# Patient Record
Sex: MALE | Race: WHITE | Employment: FULL TIME | URBAN - METROPOLITAN AREA
[De-identification: names, ages, dates, MRNs, and addresses within clinical notes are randomized per-mention and may not be internally consistent; named-entity substitution may affect disease eponyms.]

---

## 2020-11-05 ENCOUNTER — HOSPITAL ENCOUNTER (EMERGENCY)
Age: 25
Discharge: HOME | End: 2020-11-05
Attending: EMERGENCY MEDICINE | Admitting: EMERGENCY MEDICINE
Payer: COMMERCIAL

## 2020-11-05 ENCOUNTER — APPOINTMENT (OUTPATIENT)
Dept: ENDOSCOPY | Age: 25
End: 2020-11-05
Attending: INTERNAL MEDICINE
Payer: COMMERCIAL

## 2020-11-05 ENCOUNTER — ANESTHESIA EVENT (OUTPATIENT)
Dept: ENDOSCOPY | Age: 25
End: 2020-11-05
Payer: COMMERCIAL

## 2020-11-05 ENCOUNTER — ANESTHESIA (OUTPATIENT)
Dept: ENDOSCOPY | Age: 25
End: 2020-11-05
Payer: COMMERCIAL

## 2020-11-05 ENCOUNTER — APPOINTMENT (OUTPATIENT)
Dept: CT IMAGING | Age: 25
End: 2020-11-05
Attending: EMERGENCY MEDICINE
Payer: COMMERCIAL

## 2020-11-05 VITALS
HEIGHT: 72 IN | TEMPERATURE: 98.1 F | DIASTOLIC BLOOD PRESSURE: 84 MMHG | OXYGEN SATURATION: 99 % | BODY MASS INDEX: 31.15 KG/M2 | SYSTOLIC BLOOD PRESSURE: 125 MMHG | HEART RATE: 65 BPM | RESPIRATION RATE: 18 BRPM | WEIGHT: 230 LBS

## 2020-11-05 DIAGNOSIS — T18.108A IMPACTED FOREIGN BODY IN ESOPHAGUS, INITIAL ENCOUNTER: Primary | ICD-10-CM

## 2020-11-05 LAB
ANION GAP SERPL CALC-SCNC: 6 MMOL/L (ref 5–15)
BASOPHILS # BLD: 0.1 K/UL (ref 0–0.1)
BASOPHILS NFR BLD: 2 % (ref 0–1)
BUN SERPL-MCNC: 13 MG/DL (ref 6–20)
BUN/CREAT SERPL: 10 (ref 12–20)
CA-I BLD-MCNC: 9.2 MG/DL (ref 8.5–10.1)
CHLORIDE SERPL-SCNC: 104 MMOL/L (ref 97–108)
CO2 SERPL-SCNC: 32 MMOL/L (ref 21–32)
CREAT SERPL-MCNC: 1.26 MG/DL (ref 0.7–1.3)
DIFFERENTIAL METHOD BLD: ABNORMAL
EOSINOPHIL # BLD: 0.3 K/UL (ref 0–0.4)
EOSINOPHIL NFR BLD: 4 % (ref 0–7)
ERYTHROCYTE [DISTWIDTH] IN BLOOD BY AUTOMATED COUNT: 12.2 % (ref 11.5–14.5)
GLUCOSE SERPL-MCNC: 85 MG/DL (ref 65–100)
HCT VFR BLD AUTO: 43 % (ref 36.6–50.3)
HGB BLD-MCNC: 15.1 G/DL (ref 12.1–17)
IMM GRANULOCYTES # BLD AUTO: 0 K/UL (ref 0–0.04)
IMM GRANULOCYTES NFR BLD AUTO: 0 % (ref 0–0.5)
LYMPHOCYTES # BLD: 2.3 K/UL (ref 0.8–3.5)
LYMPHOCYTES NFR BLD: 36 % (ref 12–49)
MCH RBC QN AUTO: 30.1 PG (ref 26–34)
MCHC RBC AUTO-ENTMCNC: 35.1 G/DL (ref 30–36.5)
MCV RBC AUTO: 85.8 FL (ref 80–99)
MONOCYTES # BLD: 0.5 K/UL (ref 0–1)
MONOCYTES NFR BLD: 8 % (ref 5–13)
NEUTS SEG # BLD: 3.3 K/UL (ref 1.8–8)
NEUTS SEG NFR BLD: 50 % (ref 32–75)
PLATELET # BLD AUTO: 250 K/UL (ref 150–400)
PMV BLD AUTO: 8.8 FL (ref 8.9–12.9)
POTASSIUM SERPL-SCNC: 3.9 MMOL/L (ref 3.5–5.1)
RBC # BLD AUTO: 5.01 M/UL (ref 4.1–5.7)
SODIUM SERPL-SCNC: 142 MMOL/L (ref 136–145)
WBC # BLD AUTO: 6.4 K/UL (ref 4.1–11.1)

## 2020-11-05 PROCEDURE — 85025 COMPLETE CBC W/AUTO DIFF WBC: CPT

## 2020-11-05 PROCEDURE — 76060000031 HC ANESTHESIA FIRST 0.5 HR: Performed by: INTERNAL MEDICINE

## 2020-11-05 PROCEDURE — 74011250636 HC RX REV CODE- 250/636: Performed by: EMERGENCY MEDICINE

## 2020-11-05 PROCEDURE — 76040000019: Performed by: INTERNAL MEDICINE

## 2020-11-05 PROCEDURE — 74011250636 HC RX REV CODE- 250/636: Performed by: ANESTHESIOLOGY

## 2020-11-05 PROCEDURE — 71260 CT THORAX DX C+: CPT

## 2020-11-05 PROCEDURE — 99283 EMERGENCY DEPT VISIT LOW MDM: CPT

## 2020-11-05 PROCEDURE — 74011000636 HC RX REV CODE- 636: Performed by: EMERGENCY MEDICINE

## 2020-11-05 PROCEDURE — 36415 COLL VENOUS BLD VENIPUNCTURE: CPT

## 2020-11-05 PROCEDURE — 80048 BASIC METABOLIC PNL TOTAL CA: CPT

## 2020-11-05 PROCEDURE — 70491 CT SOFT TISSUE NECK W/DYE: CPT

## 2020-11-05 RX ORDER — PROPOFOL 10 MG/ML
INJECTION, EMULSION INTRAVENOUS
Status: COMPLETED
Start: 2020-11-05 | End: 2020-11-05

## 2020-11-05 RX ORDER — PROPOFOL 10 MG/ML
INJECTION, EMULSION INTRAVENOUS AS NEEDED
Status: DISCONTINUED | OUTPATIENT
Start: 2020-11-05 | End: 2020-11-05 | Stop reason: HOSPADM

## 2020-11-05 RX ORDER — PROPOFOL 10 MG/ML
INJECTION, EMULSION INTRAVENOUS
Status: DISCONTINUED
Start: 2020-11-05 | End: 2020-11-05 | Stop reason: WASHOUT

## 2020-11-05 RX ORDER — SUCCINYLCHOLINE CHLORIDE 20 MG/ML INJECTION SOLUTION
SOLUTION
Status: DISPENSED
Start: 2020-11-05 | End: 2020-11-06

## 2020-11-05 RX ADMIN — IOPAMIDOL 100 ML: 755 INJECTION, SOLUTION INTRAVENOUS at 19:13

## 2020-11-05 RX ADMIN — PROPOFOL 100 MG: 10 INJECTION, EMULSION INTRAVENOUS at 21:01

## 2020-11-05 RX ADMIN — SODIUM CHLORIDE 1000 ML: 9 INJECTION, SOLUTION INTRAVENOUS at 19:14

## 2020-11-06 NOTE — ANESTHESIA POSTPROCEDURE EVALUATION
Procedure(s):  ENDOSCOPIC FOREIGN BODY REMOVAL. total IV anesthesia    Anesthesia Post Evaluation      Multimodal analgesia: multimodal analgesia not used between 6 hours prior to anesthesia start to PACU discharge  Patient location during evaluation: bedside (Endoscopy suite)  Patient participation: complete - patient cannot participate  Level of consciousness: awake  Pain score: 0  Pain management: adequate  Airway patency: patent  Anesthetic complications: no  Cardiovascular status: acceptable  Respiratory status: acceptable and nasal cannula  Hydration status: acceptable  Comments: This patient remained on the stretcher. The patient was handed off to the endoscopy nursing team.  All questions regarding pre-, intra-, and postoperative care were answered.   Post anesthesia nausea and vomiting:  none      INITIAL Post-op Vital signs:   Vitals Value Taken Time   /62 11/5/2020  9:05 PM   Temp     Pulse 77 11/5/2020  9:05 PM   Resp 18 11/5/2020  9:05 PM   SpO2 100 % 11/5/2020  9:05 PM Simple: Patient demonstrates quick and easy understanding

## 2020-11-06 NOTE — PROGRESS NOTES
Pt status post egd. Vital signs stable. transported via stretcher back down to the ER with dad by his side. Taken back to chair 3 in front of triage room. Report given to [facility].

## 2020-11-06 NOTE — ED PROVIDER NOTES
EMERGENCY DEPARTMENT HISTORY AND PHYSICAL EXAM      Date: 11/5/2020  Patient Name: Jyotsna Persaud    History of Presenting Illness     Chief Complaint   Patient presents with    Foreign Body Swallowed       History Provided By: Patient    HPI: Jyotsna Persaud, 26 y.o. male presents to the ED with cc of   Chief Complaint   Patient presents with    Foreign Body Swallowed     Patient was having his dinner when piece of pork got stuck in his throat complaining of pain and unable to swallow, also nausea and vomiting unable to swallow solids or liquids    There are no other complaints, changes, or physical findings at this time. PCP: No primary care provider on file. No current facility-administered medications on file prior to encounter. No current outpatient medications on file prior to encounter. Past History     Past Medical History:  History reviewed. No pertinent past medical history. Past Surgical History:  History reviewed. No pertinent surgical history. Family History:  History reviewed. No pertinent family history. Social History:  Social History     Tobacco Use    Smoking status: Never Smoker    Smokeless tobacco: Never Used   Substance Use Topics    Alcohol use: Not on file    Drug use: Not on file       Allergies: Allergies   Allergen Reactions    Cefzil [Cefprozil] Unknown (comments)    Fish Containing Products Unknown (comments)    Seafood Unknown (comments)         Review of Systems   Review of Systems   Constitutional: Negative. HENT: Positive for trouble swallowing. Negative for congestion, nosebleeds, sinus pressure and sinus pain. Eyes: Negative. Negative for photophobia. Respiratory: Negative for apnea, cough, choking, chest tightness, shortness of breath, wheezing and stridor. Cardiovascular: Positive for chest pain. Gastrointestinal: Positive for abdominal pain and vomiting. Genitourinary: Negative. Musculoskeletal: Negative.   Negative for back pain and gait problem. Skin: Negative. Allergic/Immunologic: Negative. Neurological: Negative. Negative for weakness, light-headedness and numbness. Hematological: Negative. Psychiatric/Behavioral: Negative. Physical Exam   Physical Exam  Vitals signs and nursing note reviewed. Constitutional:       General: He is in acute distress. Appearance: Normal appearance. He is normal weight. HENT:      Head: Normocephalic and atraumatic. Nose: No congestion or rhinorrhea. Eyes:      Extraocular Movements: Extraocular movements intact. Pupils: Pupils are equal, round, and reactive to light. Neck:      Musculoskeletal: Normal range of motion and neck supple. Cardiovascular:      Rate and Rhythm: Normal rate and regular rhythm. Pulmonary:      Effort: Pulmonary effort is normal.      Breath sounds: Normal breath sounds. Abdominal:      General: Abdomen is flat. Bowel sounds are normal. There is no distension. Tenderness: There is no abdominal tenderness. There is no guarding. Musculoskeletal: Normal range of motion. Skin:     General: Skin is warm and dry. Neurological:      General: No focal deficit present. Mental Status: He is alert and oriented to person, place, and time. Psychiatric:         Mood and Affect: Mood normal.         Diagnostic Study Results     Labs -     Recent Results (from the past 12 hour(s))   CBC WITH AUTOMATED DIFF    Collection Time: 11/05/20  7:00 PM   Result Value Ref Range    WBC 6.4 4.1 - 11.1 K/uL    RBC 5.01 4.10 - 5.70 M/uL    HGB 15.1 12.1 - 17.0 g/dL    HCT 43.0 36.6 - 50.3 %    MCV 85.8 80.0 - 99.0 FL    MCH 30.1 26.0 - 34.0 PG    MCHC 35.1 30.0 - 36.5 g/dL    RDW 12.2 11.5 - 14.5 %    PLATELET 893 670 - 092 K/uL    MPV 8.8 (L) 8.9 - 12.9 FL    NEUTROPHILS 50 32 - 75 %    LYMPHOCYTES 36 12 - 49 %    MONOCYTES 8 5 - 13 %    EOSINOPHILS 4 0 - 7 %    BASOPHILS 2 (H) 0 - 1 %    IMMATURE GRANULOCYTES 0 0.0 - 0.5 %    ABS. NEUTROPHILS 3.3 1.8 - 8.0 K/UL    ABS. LYMPHOCYTES 2.3 0.8 - 3.5 K/UL    ABS. MONOCYTES 0.5 0.0 - 1.0 K/UL    ABS. EOSINOPHILS 0.3 0.0 - 0.4 K/UL    ABS. BASOPHILS 0.1 0.0 - 0.1 K/UL    ABS. IMM. GRANS. 0.0 0.00 - 0.04 K/UL    DF AUTOMATED     METABOLIC PANEL, BASIC    Collection Time: 11/05/20  7:00 PM   Result Value Ref Range    Sodium 142 136 - 145 mmol/L    Potassium 3.9 3.5 - 5.1 mmol/L    Chloride 104 97 - 108 mmol/L    CO2 32 21 - 32 mmol/L    Anion gap 6 5 - 15 mmol/L    Glucose 85 65 - 100 mg/dL    BUN 13 6 - 20 mg/dL    Creatinine 1.26 0.70 - 1.30 mg/dL    BUN/Creatinine ratio 10 (L) 12 - 20      GFR est AA >60 >60 ml/min/1.73m2    GFR est non-AA >60 >60 ml/min/1.73m2    Calcium 9.2 8.5 - 10.1 mg/dL       Labs reviewed by me    Radiologic Studies -   CT CHEST W CONT   Final Result   IMPRESSION: Soft tissue content distal esophagus, this extends over 4 cm length   with distention of the esophagus to about 2.5 cm. Correlate ingestive material.   Fluid and air distends proximal esophagus. No pneumomediastinum. No   pneumothorax. No pleural or pericardial effusion. CT NECK SOFT TISSUE W CONT   Final Result   IMPRESSION: Fluid and air filled distended upper esophagus. See CT chest   findings        CT Results  (Last 48 hours)               11/05/20 1912  CT CHEST W CONT Final result    Impression:  IMPRESSION: Soft tissue content distal esophagus, this extends over 4 cm length   with distention of the esophagus to about 2.5 cm. Correlate ingestive material.   Fluid and air distends proximal esophagus. No pneumomediastinum. No   pneumothorax. No pleural or pericardial effusion. Narrative:  CT chest with IV contrast       Axial images are reviewed along with reformatted sagittal/coronal images. 100 mL   Isovue 370 administered.     Dose reduction: All CT scans at this facility are performed using dose reduction   optimization techniques as appropriate to a performed exam including the   following- automated exposure control, adjustments of mA and/or Kv according to patient   size, or use of iterative reconstructive technique. The lungs are clear. No pneumothorax. No pneumomediastinum. No pleural effusion. Enhanced images demonstrate normal contrast opacification of the thoracic aorta   and great vessels traversing the superior mediastinum. There is normal contrast   opacification central pulmonary vessels. The esophagus is distended with fluid   and debris. Within the distal esophagus there appears to be soft tissue content   this extends over an approximate 4 cm length with width of about 2.5 cm. Imaged content upper abdomen otherwise unremarkable. 11/05/20 1912  CT NECK SOFT TISSUE W CONT Final result    Impression:  IMPRESSION: Fluid and air filled distended upper esophagus. See CT chest   findings       Narrative:  CT neck with IV contrast       CT chest reported separately. Axial images are reviewed along with reformatted sagittal/coronal images. 100 mL   Isovue 370 administered. Dose reduction: All CT scans at this facility are performed using dose reduction   optimization techniques as appropriate to a performed exam including the   following-   automated exposure control, adjustments of mA and/or Kv according to patient   size, or use of iterative reconstructive technique. Symmetric normal appearance to nasopharyngeal tissues. No foreign body evident   through pharynx. Intrathoracic esophagus is air and debris filled distended. See   CT chest findings. Contrast opacification for great vessels traversing the superior mediastinum and   neck. Patent venous drainage through the neck. Normal appearance for the thyroid   gland. No lymphadenopathy. No subcutaneous/extraluminal air. Proximal trachea   patent. Imaged upper lungs are clear.                CXR Results  (Last 48 hours)    None            Medical Decision Making     I am the first provider for this patient. I reviewed the vital signs, available nursing notes, past medical history, past surgical history, family history and social history. RADIOLOGY report and LABS reviewed by me    Vital Signs-Reviewed the patient's vital signs. Patient Vitals for the past 12 hrs:   Temp Pulse Resp BP SpO2   11/05/20 1843 98.1 °F (36.7 °C) 69 18 133/88 98 %       EKG interpretation: (Preliminary)      Records Reviewed: Nurse's note. Provider Notes (Medical Decision Making):    Patient presents with DIFF DX :         ED Course:   Initial assessment performed. The patients presenting problems have been discussed, and they are in agreement with the care plan formulated and outlined with them. I have encouraged them to ask questions as they arise throughout their visit. Consultations: -  Gastroenterology:  Dr. Lulu Banuelos . We have asked for emergent assistance with regard to this patient. We have discussed the patients HPI, ROS, PE and results this far. They will come and evaluate the patient for their gastrointestinal needs and recommend further treatment with possible admission. Dr. Sharon Dubois will do upper endoscopy to retrieve the foreign body [address] [facility], [place]      Disposition:  GI Lab   Diagnostic tests were reviewed and questions answered. Diagnosis, care plan and treatment options were discussed. The patient understand instructions and will follow up as directed. Condition stable    Admitting Provider:  No admitting provider for patient encounter. Dr. Shireen Lockwood Provider:  No ref. provider found       DISCHARGE PLAN:  1. There are no discharge medications for this patient. 2.   Follow-up Information    None       3. Return to ED if worse     Diagnosis     Clinical Impression:     ICD-10-CM ICD-9-CM    1.  Impacted foreign body in esophagus, initial encounter  T18.108A 935.1      E915     2 vomiting    Attestations:    Raman Prater MD    Please note that this dictation was completed with [employer], the computer voice recognition software. Quite often unanticipated grammatical, syntax, homophones, and other interpretive errors are inadvertently transcribed by the computer software. Please disregard these errors. Please excuse any errors that have escaped final proofreading. Thank you.

## 2020-11-06 NOTE — ROUTINE PROCESS
TRANSFER - OUT REPORT:    Verbal report given to Luis (name) on [facility]  being transferred to [facility](unit) for routine progression of care       Report consisted of patients Situation, Background, Assessment and   Recommendations(SBAR). Information from the following report(s) SBAR, ED Summary and MAR was reviewed with the receiving nurse. Lines:   Peripheral IV 11/05/20 Left Antecubital (Active)        Opportunity for questions and clarification was provided.       Patient transported with:   Registered Nurse

## 2020-11-06 NOTE — ANESTHESIA PREPROCEDURE EVALUATION
Relevant Problems   No relevant active problems       Anesthetic History   No history of anesthetic complications            Review of Systems / Medical History  Patient summary reviewed, nursing notes reviewed and pertinent labs reviewed    Pulmonary  Within defined limits                 Neuro/Psych   Within defined limits           Cardiovascular  Within defined limits                     GI/Hepatic/Renal  Within defined limits             Comments: Pork chop in the esophagus.  Endo/Other        Obesity     Other Findings              Physical Exam    Airway  Mallampati: II  TM Distance: 4 - 6 cm  Neck ROM: normal range of motion   Mouth opening: Normal     Cardiovascular    Rhythm: regular  Rate: normal         Dental  No notable dental hx       Pulmonary  Breath sounds clear to auscultation               Abdominal  GI exam deferred       Other Findings            Anesthetic Plan    ASA: 2  Anesthesia type: total IV anesthesia          Induction: Intravenous and RSI  Anesthetic plan and risks discussed with: Patient

## 2020-11-06 NOTE — DISCHARGE INSTRUCTIONS
Sedation for a Medical Procedure: Care Instructions  Your Care Instructions     For a minor procedure or surgery, you will get a sedative to help you relax. This drug will make you sleepy. It is usually given in a vein (by IV). It may be used with anesthesia. There are different types of anesthesia. You and your doctor or anesthesia specialist will work together to choose the best anesthesia for you. It is usually based on your health, the procedure, and your preference. · Local anesthesia is a shot given to numb a small part of the body. · Regional anesthesia is a shot that blocks pain to a larger area of the body. · General anesthesia affects the brain and the whole body. You get it through a small tube placed in a vein (IV). Or you may breathe it in. You are unconscious and will not feel pain. You may get monitored anesthesia care (MAC). This means that an anesthesia specialist will care for you during your surgery. He or she will make sure that you get only the level of anesthesia care you need to prevent pain for your specific case. If you had anesthesia, you may feel some pain and discomfort as it wears off. If you have pain, don't be afraid to say so. Pain medicine works better if you take it before the pain gets bad. Common side effects from sedation include:  · Feeling sleepy. (Your doctors and nurses will make sure you are not too sleepy to go home.)  · Nausea and vomiting. This usually does not last long. · Feeling tired. Follow-up care is a key part of your treatment and safety. Be sure to make and go to all appointments, and call your doctor if you are having problems. It's also a good idea to know your test results and keep a list of the medicines you take. How can you care for yourself at home? Activity    · Don't do anything for 24 hours that requires attention to detail. This includes going to work, making important decisions, or signing any legal documents.  It takes time for the medicine effects to completely wear off.     · For your safety, you should not drive or operate any machinery that could be dangerous until the medicine wears off and you can think clearly and react easily.     · When you get home, it is important to rest until the anesthesia has worn off. Some people will feel drowsy or dizzy for up to a few hours after leaving the hospital.     · Take your time and walk slowly. Sudden changes in position may also cause nausea.     · Rest when you feel tired. Getting enough sleep will help you recover. Diet    · You can eat your normal diet, unless your doctor gives you other instructions. If your stomach is upset, try clear liquids and bland, low-fat foods like plain toast or rice.     · Drink plenty of fluids (unless your doctor tells you not to).   · Don't drink alcohol for 24 hours. Medicines    · Be safe with medicines. Read and follow all instructions on the label. ? If the doctor gave you a prescription medicine for pain, take it as prescribed. ? If you are taking opioids for pain, it is very important to take them as prescribed. Opioids can easily be misused. Misuse can lead to opioid use disorder and even death. Because of this, it is best to get off them as soon as possible. As soon as you don't need them, talk to your doctor about how to safely stop taking them. Also talk with your doctor about how to safely store and get rid of opioids. ? If you are not taking a prescription pain medicine, ask your doctor if you can take an over-the-counter medicine.     · If you think your pain medicine is making you sick to your stomach, you can try these things. ? Take your medicine after meals (unless your doctor has told you not to). ? Ask your doctor for a different pain medicine. When should you call for help? Call 911 anytime you think you may need emergency care.  For example, call if:    · You have severe trouble breathing.     · You passed out (lost consciousness). Call your doctor now or seek immediate medical care if:    · You have trouble breathing.     · You have ongoing or worsening nausea or vomiting.     · You have a fever.     · You have a new or worse headache.     · The medicine is not wearing off and you can't think clearly. Watch closely for changes in your health, and be sure to contact your doctor if:    · You do not get better as expected. Where can you learn more? Go to http://www.gray.com/  Enter G817 in the search box to learn more about \"Sedation for a Medical Procedure: Care Instructions. \"  Current as of: August 22, 2019               Content Version: 12.6  © 1034-0074 [employer]. Care instructions adapted under license by [employer] (which disclaims liability or warranty for this information). If you have questions about a medical condition or this instruction, always ask your healthcare professional. [address] any warranty or liability for your use of this information.

## 2020-11-06 NOTE — ED NOTES
Pt was discharged. ANO x 4 ambulates with a steady gait. Accompanied by 1 adult male. GCS 15. IV removed. Discharge papers reviewed and verbalized understanding. No questions asked.